# Patient Record
Sex: FEMALE | Race: BLACK OR AFRICAN AMERICAN | NOT HISPANIC OR LATINO | Employment: UNEMPLOYED | URBAN - METROPOLITAN AREA
[De-identification: names, ages, dates, MRNs, and addresses within clinical notes are randomized per-mention and may not be internally consistent; named-entity substitution may affect disease eponyms.]

---

## 2024-10-02 ENCOUNTER — TELEPHONE (OUTPATIENT)
Age: 64
End: 2024-10-02

## 2024-10-02 NOTE — TELEPHONE ENCOUNTER
Vm on clinical line    Good morning. My name is Sherrie Hairston. I really need some information. Please call me back at [phone]. Thank you.    You received a voice mail from [employer].    Spoke with patient - scheduled new patient appt

## 2024-10-11 ENCOUNTER — OFFICE VISIT (OUTPATIENT)
Age: 64
End: 2024-10-11

## 2024-10-11 VITALS
TEMPERATURE: 98 F | HEIGHT: 64 IN | HEART RATE: 80 BPM | SYSTOLIC BLOOD PRESSURE: 149 MMHG | DIASTOLIC BLOOD PRESSURE: 75 MMHG | BODY MASS INDEX: 24.59 KG/M2 | WEIGHT: 144 LBS | OXYGEN SATURATION: 99 %

## 2024-10-11 DIAGNOSIS — Z11.59 NEED FOR HEPATITIS C SCREENING TEST: ICD-10-CM

## 2024-10-11 DIAGNOSIS — N63.21 MASS OF UPPER OUTER QUADRANT OF LEFT BREAST: ICD-10-CM

## 2024-10-11 DIAGNOSIS — Z59.9 FINANCIAL DIFFICULTIES: ICD-10-CM

## 2024-10-11 DIAGNOSIS — L24.0 IRRITANT CONTACT DERMATITIS DUE TO DETERGENT: ICD-10-CM

## 2024-10-11 DIAGNOSIS — Z00.00 ENCOUNTER FOR MEDICAL EXAMINATION TO ESTABLISH CARE: Primary | ICD-10-CM

## 2024-10-11 DIAGNOSIS — Z59.82 INABILITY TO ACQUIRE TRANSPORTATION: ICD-10-CM

## 2024-10-11 DIAGNOSIS — Z11.4 SCREENING FOR HIV (HUMAN IMMUNODEFICIENCY VIRUS): ICD-10-CM

## 2024-10-11 DIAGNOSIS — Z12.31 ENCOUNTER FOR SCREENING MAMMOGRAM FOR BREAST CANCER: ICD-10-CM

## 2024-10-11 DIAGNOSIS — Z78.0 ASYMPTOMATIC POSTMENOPAUSAL STATE: ICD-10-CM

## 2024-10-11 DIAGNOSIS — I10 PRIMARY HYPERTENSION: ICD-10-CM

## 2024-10-11 PROCEDURE — 99203 OFFICE O/P NEW LOW 30 MIN: CPT | Performed by: FAMILY MEDICINE

## 2024-10-11 RX ORDER — AMLODIPINE BESYLATE 5 MG/1
10 TABLET ORAL DAILY
COMMUNITY
End: 2024-10-11 | Stop reason: SDUPTHER

## 2024-10-11 RX ORDER — AMLODIPINE BESYLATE 10 MG/1
10 TABLET ORAL DAILY
Qty: 90 TABLET | Refills: 1 | Status: SHIPPED | OUTPATIENT
Start: 2024-10-11

## 2024-10-11 RX ORDER — DIAPER,BRIEF,INFANT-TODD,DISP
EACH MISCELLANEOUS 2 TIMES DAILY
Qty: 15 G | Refills: 0 | Status: SHIPPED | OUTPATIENT
Start: 2024-10-11

## 2024-10-11 SDOH — ECONOMIC STABILITY - TRANSPORTATION SECURITY: TRANSPORTATION INSECURITY: Z59.82

## 2024-10-11 SDOH — ECONOMIC STABILITY - INCOME SECURITY: PROBLEM RELATED TO HOUSING AND ECONOMIC CIRCUMSTANCES, UNSPECIFIED: Z59.9

## 2024-10-11 NOTE — PATIENT INSTRUCTIONS
Stop Senait Montero face wash.  Recommend a sensitive skin/fragrance free soap like Dove   Use Vanicream or Cerave moisturizer on your face (for sensitive skin)

## 2024-10-11 NOTE — PROGRESS NOTES
Ambulatory Visit  Name: Sherrie Hairston      : 1960      MRN: [id number]  Encounter Provider: Jenni Ku MD  Encounter Date: 10/11/2024   Encounter department: [facility]    Assessment & Plan  Encounter for medical examination to establish care    Orders:    CBC and differential; Future    Comprehensive metabolic panel; Future    Lipid Panel with Direct LDL reflex; Future    Primary hypertension  Chronic, uncontrolled.  /75 today.  Patient checks her blood pressure at home and typically sees systolic blood pressures 150s-160s.  Patient is compliant with amlodipine 5 mg daily.  She denies any leg swelling.  Increase amlodipine to 10 mg daily  Monitor for any lower extremity swelling.  Orders:    amLODIPine (NORVASC) 10 mg tablet; Take 1 tablet (10 mg total) by mouth daily Only takes it occasionally    Irritant contact dermatitis due to detergent  New onset purple, itchy facial and neck rash for the last 2 weeks.  Had another occurrence approximately 2 months ago.  Recently started using a new SmartKickz face wash.  Her symptoms started soon after starting the face wash.  A friend gave her some hydrocortisone cream which has been helping with the itching.  See media for image of rash.  Likely irritant contact dermatitis due to new face wash.  Advised patient to discontinue her new face wash  Recommend using a sensitive skin/fragrance free soap like Dove  Recommend using a sensitive skin formulated facial moisturizer day and night  Orders:    hydrocortisone 0.5 % cream; Apply topically 2 (two) times a day    Financial difficulties  Patient is currently uninsured, requesting help with establishing medical insurance and paying medical bills.  Orders:    Ambulatory referral to social work care management program; Future    Inability to acquire transportation    Orders:    Ambulatory referral to social work care management program; Future    Need for hepatitis C  screening test    Orders:    Hepatitis C Antibody; Future    Screening for HIV (human immunodeficiency virus)    Orders:    HIV 1/2 AG/AB w Reflex [facility] for 2 yr old and above; Future    Asymptomatic postmenopausal state    Orders:    DXA bone density spine hip and pelvis; Future    Encounter for screening mammogram for breast cancer    Orders:    Mammo screening bilateral w 3d and cad; Future    Mass of upper outer quadrant of left breast  Mammogram on 5/8/2024 of left breast demonstrated a circumscribed mass in the left upper outer quadrant which remained stable.  Targeted left breast ultrasound demonstrated a stable oval hypoechoic avascular mass measuring 12X 5X 12 mm.  Radiologist recommendations were a 6-month follow-up with diagnostic mammogram and ultrasound of the left breast.  Diagnostic mammogram with tomosynthesis and left breast ultrasound ordered  Orders:    Mammo diagnostic left w 3d and cad; Future    US breast right limited (diagnostic); Future       History of Present Illness     Patient presents to establish care.  Patient lanes of new purpleish discoloration and itching on her face, skin, and upper back. No one else has a rash like it in the house. She started using a new face soap about 2 months ago.  She had a first occurrence of the rash 2 months ago.  The rashj recurred 2 weeks ago and has remained.  Her face was previously swollen a few days ago but the swelling has resolved.  The rash is also itchy.  The itchiness is somewhat resolved by using over-the-counter hydrocortisone cream.    Patient was diagnosed with hypertension many years ago.  She is compliant with amlodipine 5mg.   Patient had a L breast ultrasound in May for a breast mass.  It was stable but they recommended a repeat mammogram and ultrasound in 6 months.          History obtained from : patient  Review of Systems   Constitutional: Negative.    HENT: Negative.     Eyes: Negative.    Respiratory:  Negative for cough, shortness  "of breath and wheezing.    Cardiovascular:  Negative for chest pain and leg swelling.   Gastrointestinal:  Negative for abdominal pain, constipation, diarrhea, rectal pain and vomiting.   Endocrine: Negative.    Genitourinary: Negative.    Musculoskeletal: Negative.    Skin:  Positive for rash.   Allergic/Immunologic: Negative.    Neurological: Negative.    Hematological: Negative.    Psychiatric/Behavioral: Negative.       Medical History Reviewed by provider this encounter:  Allergies           Objective     /75 (BP Location: Left arm, Patient Position: Sitting, Cuff Size: Standard)   Pulse 80   Temp 98 °F (36.7 °C) (Tympanic)   Ht 5' 4\" (1.626 m)   Wt 65.3 kg (144 lb)   SpO2 99%   BMI 24.72 kg/m²     Physical Exam  Vitals and nursing note reviewed.   Constitutional:       General: She is not in acute distress.     Appearance: She is well-developed.   HENT:      Head: Normocephalic and atraumatic.   Eyes:      Conjunctiva/sclera: Conjunctivae normal.   Cardiovascular:      Rate and Rhythm: Normal rate and regular rhythm.      Heart sounds: No murmur heard.  Pulmonary:      Effort: Pulmonary effort is normal. No respiratory distress.      Breath sounds: Normal breath sounds.   Abdominal:      Palpations: Abdomen is soft.      Tenderness: There is no abdominal tenderness.   Musculoskeletal:         General: No swelling.      Cervical back: Neck supple.   Skin:     General: Skin is warm and dry.      Capillary Refill: Capillary refill takes less than 2 seconds.      Findings: Rash (Purple, dry, itchy rash present diffusely on face, anterior neck, scattered on upper back.  Some excoriations present where patient itched) present.   Neurological:      Mental Status: She is alert.   Psychiatric:         Mood and Affect: Mood normal.       "

## 2024-10-21 ENCOUNTER — TELEPHONE (OUTPATIENT)
Age: 64
End: 2024-10-21

## 2024-10-21 ENCOUNTER — OFFICE VISIT (OUTPATIENT)
Age: 64
End: 2024-10-21

## 2024-10-21 VITALS
WEIGHT: 145 LBS | BODY MASS INDEX: 24.75 KG/M2 | TEMPERATURE: 98.6 F | DIASTOLIC BLOOD PRESSURE: 81 MMHG | HEIGHT: 64 IN | HEART RATE: 71 BPM | SYSTOLIC BLOOD PRESSURE: 177 MMHG

## 2024-10-21 DIAGNOSIS — L30.9 DERMATITIS: Primary | ICD-10-CM

## 2024-10-21 PROCEDURE — 99213 OFFICE O/P EST LOW 20 MIN: CPT | Performed by: FAMILY MEDICINE

## 2024-10-21 RX ORDER — BETAMETHASONE DIPROPIONATE 0.05 %
GEL (GRAM) TOPICAL 2 TIMES DAILY
Qty: 50 G | Refills: 0 | Status: SHIPPED | OUTPATIENT
Start: 2024-10-21

## 2024-10-21 NOTE — PROGRESS NOTES
"Ambulatory Visit  Name: Sherrie Hairston      : 1960      MRN: [id number]  Encounter Provider: Citlalli Munoz DO  Encounter Date: 10/21/2024   Encounter department: [facility]    Assessment & Plan  Dermatitis  Suspect acute on chronic nature in setting of rash. Patient noting hx of similar rashes - this one started after exposure to ophelia harrison serum.     Plan:  -continue prn hydrocortisone   -start betamethasone   Orders:    betamethasone, augmented, (DIPROLENE) 0.05 % gel; Apply topically 2 (two) times a day    TSH, 3rd generation with Free T4 reflex; Future       History of Present Illness     HPI    Presents to the office for irritant dermatitis of face:  -was using triamcinalone with some improvement   -start betamethasone topical  -flexural dermatitis, hx of similar episodes in the past which self resolved   -has not taken medications today       Review of Systems   All other systems reviewed and are negative.          Objective     BP (!) 177/81 (BP Location: Left arm, Patient Position: Sitting, Cuff Size: Standard)   Pulse 71   Temp 98.6 °F (37 °C) (Tympanic)   Ht 5' 4\" (1.626 m)   Wt 65.8 kg (145 lb)   BMI 24.89 kg/m²     Physical Exam  Vitals and nursing note reviewed.   Constitutional:       General: She is not in acute distress.     Appearance: She is well-developed.   HENT:      Head: Normocephalic and atraumatic.   Eyes:      Conjunctiva/sclera: Conjunctivae normal.   Cardiovascular:      Rate and Rhythm: Normal rate and regular rhythm.      Heart sounds: No murmur heard.  Pulmonary:      Effort: Pulmonary effort is normal. No respiratory distress.      Breath sounds: Normal breath sounds.   Abdominal:      Palpations: Abdomen is soft.      Tenderness: There is no abdominal tenderness.   Musculoskeletal:         General: No swelling.      Cervical back: Neck supple.   Skin:     General: Skin is warm and dry.      Capillary Refill: Capillary refill takes " less than 2 seconds.      Findings: Rash (Purple, dry, itchy rash present diffusely on face, anterior neck, scattered on upper back.  Some excoriations present where patient itched) present.   Neurological:      Mental Status: She is alert.   Psychiatric:         Mood and Affect: Mood normal.

## 2024-10-21 NOTE — TELEPHONE ENCOUNTER
Patient came in office stating that the hydrocortisone cream has not been working. She stated that it made it worse, and she feels she may be allergic to that as well. Can you review? Are you able to send a different medication to the pharmacy?

## 2024-10-28 ENCOUNTER — PATIENT OUTREACH (OUTPATIENT)
Age: 64
End: 2024-10-28

## 2024-10-28 NOTE — PROGRESS NOTES
SWCM received referral from provider to assist patient with needs, SDOH: transportation and financial difficulties.    SWCM completed chart review. SWCM called patient to follow up and assist with needs. SWCM unable to reach patient. Left voice message requesting return call. Contact information provided.      SWCM will continue to follow up and remain available to assist as needed.

## 2024-11-12 ENCOUNTER — TELEPHONE (OUTPATIENT)
Age: 64
End: 2024-11-12

## 2024-11-12 ENCOUNTER — OFFICE VISIT (OUTPATIENT)
Age: 64
End: 2024-11-12

## 2024-11-12 VITALS
TEMPERATURE: 98.6 F | OXYGEN SATURATION: 99 % | HEIGHT: 64 IN | SYSTOLIC BLOOD PRESSURE: 170 MMHG | DIASTOLIC BLOOD PRESSURE: 96 MMHG | WEIGHT: 145 LBS | HEART RATE: 80 BPM | BODY MASS INDEX: 24.75 KG/M2

## 2024-11-12 DIAGNOSIS — R03.0 ELEVATED BLOOD PRESSURE READING: ICD-10-CM

## 2024-11-12 DIAGNOSIS — L30.9 DERMATITIS: Primary | ICD-10-CM

## 2024-11-12 PROCEDURE — 99213 OFFICE O/P EST LOW 20 MIN: CPT | Performed by: FAMILY MEDICINE

## 2024-11-12 RX ORDER — BETAMETHASONE DIPROPIONATE 0.5 MG/G
OINTMENT, AUGMENTED TOPICAL 2 TIMES DAILY
Qty: 50 G | Refills: 0 | Status: SHIPPED | OUTPATIENT
Start: 2024-11-12

## 2024-11-12 RX ORDER — BETAMETHASONE DIPROPIONATE 0.05 %
GEL (GRAM) TOPICAL 2 TIMES DAILY
Qty: 50 G | Refills: 0 | Status: SHIPPED | OUTPATIENT
Start: 2024-11-12 | End: 2024-11-12

## 2024-11-12 RX ORDER — KETOCONAZOLE 20 MG/ML
1 SHAMPOO, SUSPENSION TOPICAL 2 TIMES WEEKLY
Qty: 120 ML | Refills: 0 | Status: SHIPPED | OUTPATIENT
Start: 2024-11-14

## 2024-11-12 RX ORDER — DIAPER,BRIEF,INFANT-TODD,DISP
EACH MISCELLANEOUS 2 TIMES DAILY
Qty: 15 G | Refills: 0 | Status: SHIPPED | OUTPATIENT
Start: 2024-11-12 | End: 2024-11-12

## 2024-11-12 RX ORDER — HYDROCORTISONE 25 MG/G
CREAM TOPICAL 2 TIMES DAILY PRN
Qty: 20 G | Refills: 0 | Status: SHIPPED | OUTPATIENT
Start: 2024-11-12

## 2024-11-12 NOTE — PROGRESS NOTES
Ambulatory Visit  Name: Sherrie Hairston      : 1960      MRN: [id number]  Encounter Provider: Hailey Swift DO  Encounter Date: 2024   Encounter department: [facility]    Assessment & Plan  Dermatitis  Rash on face and back. States has improved but only slightly. Back still very itchy when not using diprolene or hydrocortisone.  Has been using diprolene on back and face.   Per chart review, purple itchy facial and neck rash started after using new Shantelle face wash, advised to discontinue.     Advised patient to stop using Diprolene and hydrocortisone on face as high-dose steroids can cause thinning of the skin and discoloration. Patient expressed understanding   As patient has been seen in the office 3 times over the past month with minimal improvement, recommend seeing dermatologist   If unable to see dermatology soon, advised patient to schedule appointment with [facility] for biopsy of back rash   Can also trial Ketoconazole wash on back to help with rash as it may be fungal in nature (suspected tinea). Recommend discontinuing if does not provide improvement or worsens rash.     Orders:    ketoconazole (NIZORAL) 2 % shampoo; Apply 1 Application topically 2 (two) times a week    Ambulatory Referral to Dermatology; Future    hydrocortisone 2.5 % cream; Apply topically 2 (two) times a day as needed for irritation or rash    betamethasone, augmented, (DIPROLENE) 0.05 % ointment; Apply topically 2 (two) times a day    Elevated blood pressure reading  /99 in office, repeat 170/96  On amlodipine, only takes occasionally   Advised to take amlodipine as prescribed.  Check BP at home and keep a log.  Follow up in 4 weeks, bring log with you           History of Present Illness     Rash on face and back. States has improved but only slightly. Back still very itchy when not using diprolene or hydrocortisone.  Has been using diprolene on back and face.   Requesting refill of  "diprolene and hydrocortisone           Review of Systems   Constitutional:  Negative for chills and fever.   Skin:  Positive for rash (back and face).   Neurological:  Negative for dizziness and headaches.   All other systems reviewed and are negative.          Objective     /96   Pulse 80   Temp 98.6 °F (37 °C) (Tympanic)   Ht 5' 4\" (1.626 m)   Wt 65.8 kg (145 lb)   SpO2 99%   BMI 24.89 kg/m²     Physical Exam  Vitals reviewed.   Constitutional:       General: She is not in acute distress.     Appearance: She is normal weight.   HENT:      Head: Normocephalic and atraumatic.      Mouth/Throat:      Mouth: Mucous membranes are moist.   Eyes:      General: No scleral icterus.     Extraocular Movements: Extraocular movements intact.      Conjunctiva/sclera: Conjunctivae normal.   Cardiovascular:      Rate and Rhythm: Normal rate and regular rhythm.      Heart sounds: Normal heart sounds. No murmur heard.  Pulmonary:      Effort: Pulmonary effort is normal. No respiratory distress.      Breath sounds: Normal breath sounds.   Musculoskeletal:         General: Normal range of motion.      Cervical back: Normal range of motion.      Right lower leg: No edema.      Left lower leg: No edema.   Skin:     General: Skin is warm.      Findings: Rash (face and back, see media for images) present.   Neurological:      Mental Status: She is alert and oriented to person, place, and time.   Psychiatric:         Mood and Affect: Mood normal.         Behavior: Behavior normal.         "

## 2024-11-12 NOTE — TELEPHONE ENCOUNTER
Hi Nicola Reeves contacted us about the following:      hydrocortisone 0.5 % cream   - they carry 2.5% that is by script., 1% can be bought over the counter.    betamethasone, augmented, (DIPROLENE) 0.05 % gel  - does not come in a gel, it comes as a AF cream or an ointment.    Please advise.

## 2024-11-15 ENCOUNTER — PATIENT OUTREACH (OUTPATIENT)
Age: 64
End: 2024-11-15

## 2024-11-15 NOTE — LETTER
11/15/24    Dear Sherrie Hairston,    I am a Care Manager with [address] [address] [phone].     We have made several attempts to call you by phone.  It is important that you contact us back at [phone] so that we can assist with your care needs.     Sincerely,     Shannon Gann LCSW  Social Work Care Manager

## 2024-11-25 DIAGNOSIS — L30.9 DERMATITIS: ICD-10-CM

## 2024-11-25 NOTE — TELEPHONE ENCOUNTER
VM left on Rx Line     This is Sherrie Hairston and this is her a refill from the pharmacy at [facility]. The pharmacy has called the doctor but she has not replied. They need they have the cream, not the jab as the doctor suggested. Please call. Please have the doctor call Nicola Hernandez. Sherrie Hairston, thank you.    You received a voice mail from [facility].

## 2024-11-26 ENCOUNTER — TELEPHONE (OUTPATIENT)
Age: 64
End: 2024-11-26

## 2024-11-26 DIAGNOSIS — L30.9 DERMATITIS: ICD-10-CM

## 2024-11-26 RX ORDER — HYDROCORTISONE 25 MG/G
CREAM TOPICAL 2 TIMES DAILY PRN
Qty: 20 G | Refills: 0 | Status: SHIPPED | OUTPATIENT
Start: 2024-11-26 | End: 2024-11-27 | Stop reason: SDUPTHER

## 2024-11-26 NOTE — TELEPHONE ENCOUNTER
Patient called in to request that pcp send [facility] a rx for betamethasone, augmented, (DIPROLENE) 0.05 % gel  the CREAM. They dont have the gel and would need a  new rx for the cream. Please Review, Thank you

## 2024-11-27 ENCOUNTER — TELEPHONE (OUTPATIENT)
Age: 64
End: 2024-11-27

## 2024-11-27 DIAGNOSIS — L30.9 DERMATITIS: ICD-10-CM

## 2024-11-27 RX ORDER — HYDROCORTISONE 25 MG/G
CREAM TOPICAL 2 TIMES DAILY PRN
Qty: 20 G | Refills: 0 | Status: SHIPPED | OUTPATIENT
Start: 2024-11-27

## 2024-11-27 NOTE — TELEPHONE ENCOUNTER
Need for clarification     Pres Drug: hydrocortisone 2.5% cream    Notes to prescriber: New rx with matching state license on rx please    Sending to preceptor, can you resend rx to [facility]?

## 2024-12-03 RX ORDER — BETAMETHASONE DIPROPIONATE 0.5 MG/G
OINTMENT, AUGMENTED TOPICAL 2 TIMES DAILY
Qty: 50 G | Refills: 0 | Status: SHIPPED | OUTPATIENT
Start: 2024-12-03

## 2024-12-24 ENCOUNTER — PATIENT OUTREACH (OUTPATIENT)
Age: 64
End: 2024-12-24

## 2024-12-24 DIAGNOSIS — Z59.71 DOES NOT HAVE HEALTH INSURANCE: Primary | ICD-10-CM

## 2024-12-24 DIAGNOSIS — Z59.71 MEDICATION NOT COVERED BY INSURANCE: ICD-10-CM

## 2024-12-24 NOTE — PROGRESS NOTES
"CLAU OLSON received email from [facility] [employer], as forwarded by GERARDO Thomas:    \"This pt needs help with a medication called dupixent and it's an antibody injection.\"    CLAU OLSON completed chart review. Per chart, patient was last seen by PCP on 11/12/2024 for dermatitis. Patient was prescribed rx diprolene at that time - No orders of rx Dupixent noted in patient's medication list.    GERARDO called patient to follow up and assist with needs. CLAU OLSON spoke with patient. CLAU OLSON introduced self, role, and reason for outreach. Contact information provided.     Patient reports an ongoing rash that resembles \"tiger stripes\" throughout her entire body -- She reports being provided a paper script for rx Dupixent by Dr. Tiffani Miranda of [facility] but is unable to afford the medication as she is without any insurance or income.     Patient reports residing with her daughter in [place], [place] who provides financial assistance with all needs, including food and transportation, but does not have insurance and has not signed up for Medicare during this year's open enrollment period. Patient reports she was not aware how to sign up or who to ask for help. Patient requests assistance with obtaining [place] and [employer] income.    CLAU OLSON reviewed GoodRx - Patient is familiar with this program and is agreeable to receiving coupon if affordable. Patient's preferred pharmacy is [facility] in [place]. Patient requests the coupon information verbally as she does not have an active email address.    CLAU OLSON reviewed available coupons for medication assistance - Per GoodRx, the cost of rx Dupixent is several thousand dollars (including with a coupon).    CLAU OLSON outreached OP RN WHITNEY Jaime to discuss patient's medication needs (x2) - Per Venkatesh, patient will need to contact the prescribing doctor to request assistance with obtaining a new medication. Venkatesh reports there is an available financial assistance program to offset the cost of " rx Dupixent, however paperwork would need to be completed by the prescribing doctor and may take time to process. Venkatesh agreed to outreach patient to discuss same.    CLAU OLSON outreached patient again to advise her of Venkatesh's plan to contact her to discuss medication needs. Patient agreeable.    CLAU OLSON provided patient with contact information for the Social Security Office in [place], [place] to schedule an in-person appointment for assistance with signing up for SSI benefits.    CLAU OLSON also discussed [place] MA process - Patient requests assistance with completing application as she is not familiar with the process and does not have an email. Patient agreeable to referral to [facility] for assistance. Patient very appreciative.    No further immediate CLAU OLSON needs identified.    GERARDO added self to care team and will continue to follow up to assist as needed.

## 2024-12-26 ENCOUNTER — PATIENT OUTREACH (OUTPATIENT)
Age: 64
End: 2024-12-26

## 2024-12-26 NOTE — PROGRESS NOTES
Outreach to Dr. Miranda's office. S/W office staff regarding patients prescription for Dupixent. Staff member reviewed chart and advised this RN CM that if Dupixent was ordered she would have had to submit/process  paperwork to get medication. She does not see any prescription for Dupixent listed in patients chart.  Staff member states Clobetasol was order by Dr. Miranda.  Staff will reach out to patient for clarification.     Note routed to Kalina OLGUIN CM.    PEG OLSON will continue to follow.

## 2025-01-02 ENCOUNTER — PATIENT OUTREACH (OUTPATIENT)
Age: 65
End: 2025-01-02

## 2025-01-02 NOTE — PROGRESS NOTES
CMOC received referral for patient needing health insurance.     CMOC completed chart review. CMOC placed a call to patient. CMOC connected the call to patient and introduced herself and reason for the call.     Patient stated that she does not have any income, as she just turned 64. Patient plans to apply for social security at this time. Patient requested CMOC text her address for information purposes. CMOC sent text with address to patient, including web address: [email or web address].     Patient stated she does not have any insurance at this time and has been utilizing a prescription saving card to afford her medications. Patient stated she has never applied for medicaid or medicare. CMOC advised patient to discuss medicare coverage when presenting to Social security. CMOC discussed [facility]  with patient, including medicaid and SNAP benefits. Patient requested address be sent to her for .     Patient stated she lives with her daughter, who supports her currently. Perez Hairston, daughter, added to contacts.     CMOC added to the care team.     CMOC assessment completed.     CMOC will continue to follow up.

## 2025-01-07 ENCOUNTER — PATIENT OUTREACH (OUTPATIENT)
Age: 65
End: 2025-01-07

## 2025-01-07 NOTE — PROGRESS NOTES
Outreach to patient to follow up on medication that was prescribed by Dr. Erazo, dermatologist. LVM requesting return call.     Case discussed with CLAU OLSON. CLAU OLSON continues to work with patient on insurance issues. RN Cm will close episode at this time. Will reopen if new referral is received or if CLAU CM identifies any additional needs.

## 2025-01-13 ENCOUNTER — PATIENT OUTREACH (OUTPATIENT)
Age: 65
End: 2025-01-13

## 2025-01-13 NOTE — PROGRESS NOTES
CMOC placed a call to patient, following up on patient needing health insurance.     CMOC connected the call to patient. Patient stated that she received the text from [facility] indicating address for [facility] and [facility]. Patient stated that she has not had a chance to go to either locations as she is dependent on her daughters availability for transportation. Patient stated that she also cares for her grandchild when daughter works. Patient was open to information regarding [facility] Transportation via text.     Patient has concerns about her mammogram being scheduled in [place]. CMOC confirmed that appt was for 2/6 at 12:30 at [facility]. Patient was pleased to know her appointment was local. Patient requested CMOC send the phone number for central scheduling.     Text sent.     Patient reported getting to these locations may take her some time. CMOC will continue to follow up with patient.

## 2025-01-27 ENCOUNTER — PATIENT OUTREACH (OUTPATIENT)
Age: 65
End: 2025-01-27

## 2025-01-27 NOTE — PROGRESS NOTES
CMOC placed a call to patient, following up on patient needing health insurance.     [facility] was UTR patient at this time and a voicemail was left, requesting a call back.     [facility] will continue to follow up.

## 2025-01-31 ENCOUNTER — PATIENT OUTREACH (OUTPATIENT)
Age: 65
End: 2025-01-31

## 2025-01-31 NOTE — PROGRESS NOTES
Patient due for outreach today.    [facility] spoke with patient via phone. Patient reports doing well, but is currently recovering from a cold.     Patient states she has been unable to present to the Social Security office to discuss SSI and Medicare benefits due to lack of transportation. Patient is also caring for her 2 year old granddaughter during the day.     Per chart review, MAYITO Sparrow had provided patient with the contact information for [facility] -- Patient states she did not receive it and would like it to be re-sent through text. MAYITO Kyara made aware through TEAMS and in-basket message. Text sent.    Patient states she will keep trying to work something out with her daughter to present to the Social Security office. In the meantime, patient remains uninsured and without income. Patient is continuing to be supported by her daughter.    [facility] will continue to outreach.

## 2025-02-03 ENCOUNTER — PATIENT OUTREACH (OUTPATIENT)
Age: 65
End: 2025-02-03

## 2025-02-03 NOTE — PROGRESS NOTES
"CMOC received a request from OP CLAU Gilman that patient has not received information for [facility] Transportation, previously sent. Patient is requesting this information sent via text, again, at this time.     CMOC sent text to patient, providing [facility] information and contact phone number.     CMOC received incoming text from patient stating, \"Got it thank you\"     CMOC placed a call to patient on this day, following up on transportation and need for health insurance. CMOC was UTR patient at this time and a voicemail was left, requesting a call back.     CMOC will continue to follow up  "

## 2025-02-06 ENCOUNTER — HOSPITAL ENCOUNTER (OUTPATIENT)
Dept: RADIOLOGY | Facility: HOSPITAL | Age: 65
Discharge: HOME | End: 2025-02-06

## 2025-02-06 DIAGNOSIS — N63.21 MASS OF UPPER OUTER QUADRANT OF LEFT BREAST: ICD-10-CM

## 2025-02-06 PROCEDURE — 77066 DX MAMMO INCL CAD BI: CPT

## 2025-02-06 PROCEDURE — G0279 TOMOSYNTHESIS, MAMMO: HCPCS

## 2025-02-10 ENCOUNTER — PATIENT OUTREACH (OUTPATIENT)
Age: 65
End: 2025-02-10

## 2025-02-10 NOTE — PROGRESS NOTES
CMOC placed a call to patient, following up on patient needing health insurance.     CMOC was UTR patient at this time. CMOC was unable to leave a message as phone connected and disconnected immediately (x2). CMOC will send UTR letter via [employer].     CMOC will close episode and remove herself from patient care team at this time.     No future outreach scheduled.

## 2025-02-10 NOTE — LETTER
02/10/25    Dear Sherrie Hairston,    I am a Community Health Worker with     [address] [address]    I have made several attempts to call you by phone.  It is important that you contact me back at [phone] if I can further assist with your care needs.     Sincerely,       Kayra Kim

## 2025-02-18 ENCOUNTER — PATIENT OUTREACH (OUTPATIENT)
Age: 65
End: 2025-02-18

## 2025-02-18 NOTE — PROGRESS NOTES
CLAU OLSON called patient to follow up and assist with needs. CLAU OLSON unable to reach patient. Left voice message requesting return call. Contact information provided.      CLAU OLSON will remain available to assist as needed.

## 2025-02-24 ENCOUNTER — PATIENT OUTREACH (OUTPATIENT)
Age: 65
End: 2025-02-24

## 2025-02-24 NOTE — PROGRESS NOTES
SWCM called patient to follow up and assist with needs. SWCM successfully connected the call to patient.     Patient reports doing well, but has not been able to present to the Social Security office to discuss SSI and Medicare benefits. Patient continues to report transportation barriers due to her daughter's work schedule.    Per chart review, MAYITO Sparrow had provided patient with the contact information for [facility] [place] [employer] -- Patient states she accidentally deleted it. MAYITO Sparrow made aware to please re-send via text.    Patient denies further SWCM needs at this time as she will continue her plan to present to the SSI office independently. Of note, patient states she is not a citizen and is working on obtaining a green card. Patient to discuss Medicare and SSI qualifications with the Social Security office during her appointment.    SWCM will close referral as patient has been provided with the appropriate resources to address her insurance needs and can navigate them independently. SWCM will remain available as further needs arise.